# Patient Record
Sex: MALE | Race: WHITE | NOT HISPANIC OR LATINO | Employment: STUDENT | URBAN - METROPOLITAN AREA
[De-identification: names, ages, dates, MRNs, and addresses within clinical notes are randomized per-mention and may not be internally consistent; named-entity substitution may affect disease eponyms.]

---

## 2023-06-08 ENCOUNTER — TELEPHONE (OUTPATIENT)
Dept: PEDIATRICS | Facility: CLINIC | Age: 18
End: 2023-06-08
Payer: COMMERCIAL

## 2023-06-09 ENCOUNTER — OFFICE VISIT (OUTPATIENT)
Dept: PEDIATRICS | Facility: CLINIC | Age: 18
End: 2023-06-09
Payer: COMMERCIAL

## 2023-06-09 VITALS — WEIGHT: 174.6 LBS | TEMPERATURE: 98.8 F

## 2023-06-09 DIAGNOSIS — B34.9 VIRAL SYNDROME: Primary | ICD-10-CM

## 2023-06-09 DIAGNOSIS — J02.9 ACUTE PHARYNGITIS, UNSPECIFIED ETIOLOGY: ICD-10-CM

## 2023-06-09 PROBLEM — M43.10 SPONDYLOLISTHESIS: Status: ACTIVE | Noted: 2019-06-12

## 2023-06-09 PROBLEM — S06.0X0A CONCUSSION WITHOUT LOSS OF CONSCIOUSNESS: Status: ACTIVE | Noted: 2023-06-09

## 2023-06-09 LAB — POC RAPID STREP: NEGATIVE

## 2023-06-09 PROCEDURE — 87880 STREP A ASSAY W/OPTIC: CPT | Performed by: PEDIATRICS

## 2023-06-09 PROCEDURE — 87070 CULTURE OTHR SPECIMN AEROBIC: CPT

## 2023-06-09 PROCEDURE — 99213 OFFICE O/P EST LOW 20 MIN: CPT | Performed by: PEDIATRICS

## 2023-06-09 PROCEDURE — 87205 SMEAR GRAM STAIN: CPT

## 2023-06-09 NOTE — PROGRESS NOTES
Subjective   History was provided by the patient.  Michael Juan is a 17 y.o. male who presents for evaluation of Headache, Congestion, Sore Throat, and Swollen glands  Onset of symptoms was 8 day(s) ago as relatively mild ST, did feel worse a few days ago but is now improving again.  No fevers throughout.  Still eating and drinking.    He is drinking plenty of fluids.   Evaluation to date: none  Treatment to date: none  Ill Contact: Strep in some coworks.  Also had oral sex unprotected and has some concerns might be an STI since he has had GC pharyngitis before.      Objective   Visit Vitals  Temp 37.1 °C (98.8 °F)   Wt 79.2 kg (174 lb 9.6 oz)      Physical Exam  Vitals and nursing note reviewed. Exam conducted with a chaperone present.   Constitutional:       Appearance: Normal appearance.   HENT:      Head: Normocephalic and atraumatic.      Right Ear: Tympanic membrane, ear canal and external ear normal.      Left Ear: Tympanic membrane, ear canal and external ear normal.      Nose: Nose normal.      Mouth/Throat:      Mouth: Mucous membranes are moist.      Pharynx: No posterior oropharyngeal erythema (mild-mod).      Tonsils: 2+ on the right. 2+ on the left.   Eyes:      Conjunctiva/sclera: Conjunctivae normal.      Pupils: Pupils are equal, round, and reactive to light.   Neck:      Comments: Shotty ant cervical LN B  Cardiovascular:      Rate and Rhythm: Normal rate and regular rhythm.      Heart sounds: Normal heart sounds. No murmur heard.  Pulmonary:      Effort: Pulmonary effort is normal.      Breath sounds: Normal breath sounds.   Abdominal:      General: Abdomen is flat. Bowel sounds are normal.      Palpations: Abdomen is soft.   Musculoskeletal:      Cervical back: Normal range of motion and neck supple.   Lymphadenopathy:      Cervical: No cervical adenopathy.   Skin:     General: Skin is warm.   Neurological:      Mental Status: He is alert and oriented to person, place, and time. Mental status  is at baseline.         RAPID TESTING:  Rapid Strep  negative  SWABS SENT TODAY INCLUDE:  Bacterial culture swab      Diagnoses and all orders for this visit:  Viral syndrome  Acute pharyngitis, unspecified etiology  -     Respiratory Culture/Smear; Future  -     POCT rapid strep A manually resulted   Generally well appearing with reassuring exam.  Rapid strep neg, will await full bacterial culture of throat (including GC/CT/strep).  If all negative, then likely other viral syndrome.  Supportive care, push fluids and follow up as needed if sx persist or worsen.

## 2023-06-11 LAB
GRAM STN SPEC: NORMAL
RESPIRATORY CULTURE/SMEAR: NORMAL

## 2023-06-12 NOTE — RESULT ENCOUNTER NOTE
Please let Michael know that there wasn't anything specific grown on his throat culture.  Hopefully he is feeling better and please let us know if there are other concerns. Thanks!

## 2024-06-03 ENCOUNTER — OFFICE VISIT (OUTPATIENT)
Dept: PEDIATRICS | Facility: CLINIC | Age: 19
End: 2024-06-03
Payer: COMMERCIAL

## 2024-06-03 VITALS — BODY MASS INDEX: 23.09 KG/M2 | WEIGHT: 167.4 LBS | TEMPERATURE: 97.7 F

## 2024-06-03 DIAGNOSIS — J02.9 SORE THROAT: ICD-10-CM

## 2024-06-03 DIAGNOSIS — J01.90 ACUTE NON-RECURRENT SINUSITIS, UNSPECIFIED LOCATION: Primary | ICD-10-CM

## 2024-06-03 LAB — POC RAPID STREP: NEGATIVE

## 2024-06-03 PROCEDURE — 87880 STREP A ASSAY W/OPTIC: CPT | Performed by: PEDIATRICS

## 2024-06-03 PROCEDURE — 99213 OFFICE O/P EST LOW 20 MIN: CPT | Performed by: PEDIATRICS

## 2024-06-03 RX ORDER — AMOXICILLIN 875 MG/1
875 TABLET, FILM COATED ORAL 2 TIMES DAILY
Qty: 14 TABLET | Refills: 0 | Status: SHIPPED | OUTPATIENT
Start: 2024-06-03 | End: 2024-06-10

## 2024-06-03 NOTE — PROGRESS NOTES
"Subjective   Michael Juan is a 18 y.o. male who presents for Nasal Congestion (Congestion/sore throat/eye discharge/fatigue since May 17th/Here by himself).  Today he is accompanied by caregiver who is also providing history.  HPI:    2-3 wks of sx.  Started when in [place].  Possible fevers at onset.  Sick contacts:  was in a group of 16 and some viral sx present.  No vomiting.  Thick yellow sputum in morning.    Objective   Temp 36.5 °C (97.7 °F) (Tympanic)   Wt 75.9 kg (167 lb 6.4 oz)   BMI 23.09 kg/m²   Physical Exam  Constitutional:       Appearance: Normal appearance.   HENT:      Right Ear: Tympanic membrane and external ear normal.      Left Ear: Tympanic membrane and external ear normal.      Nose: Congestion and rhinorrhea present.      Mouth/Throat:      Mouth: Mucous membranes are moist.   Eyes:      General:         Right eye: No discharge.         Left eye: No discharge.      Extraocular Movements: Extraocular movements intact.      Conjunctiva/sclera: Conjunctivae normal.      Pupils: Pupils are equal, round, and reactive to light.   Cardiovascular:      Rate and Rhythm: Normal rate and regular rhythm.      Heart sounds: Normal heart sounds.   Pulmonary:      Effort: Pulmonary effort is normal.      Breath sounds: Normal breath sounds.   Abdominal:      General: Bowel sounds are normal.      Palpations: Abdomen is soft.   Musculoskeletal:      Cervical back: Neck supple.   Lymphadenopathy:      Cervical: No cervical adenopathy.   Skin:     General: Skin is warm.   Neurological:      General: No focal deficit present.      Mental Status: He is alert.       Assessment/Plan   Problem List Items Addressed This Visit    None  Visit Diagnoses       Acute non-recurrent sinusitis, unspecified location    -  Primary    Relevant Medications    amoxicillin (Amoxil) 875 mg tablet    Sore throat        Relevant Orders    POCT rapid strep A manually resulted (Completed)        ENCOURAGED \"HOLD\" OPTION  As pt " is well appearing, no fevers, and infection is mild, I advised to give a few more days to see how things progress. If symptoms persist, or worsen, start abx, which has been sent to pharmacy.  Discussed sx tx.  -F/U after 2-3 days of abx if not improving.  Defer strep PCR:  low suspicion at this time.

## 2024-08-06 ENCOUNTER — OFFICE VISIT (OUTPATIENT)
Dept: DERMATOLOGY | Facility: CLINIC | Age: 19
End: 2024-08-06
Payer: COMMERCIAL

## 2024-08-06 DIAGNOSIS — L72.0 EPIDERMOID CYST: Primary | ICD-10-CM

## 2024-08-06 DIAGNOSIS — L85.3 XEROSIS CUTIS: ICD-10-CM

## 2024-08-06 DIAGNOSIS — L73.9 FOLLICULITIS: ICD-10-CM

## 2024-08-06 PROCEDURE — 99204 OFFICE O/P NEW MOD 45 MIN: CPT | Performed by: DERMATOLOGY

## 2024-08-06 RX ORDER — KETOCONAZOLE 20 MG/G
CREAM TOPICAL
COMMUNITY
Start: 2023-08-09

## 2024-08-06 RX ORDER — CLINDAMYCIN PHOSPHATE 10 UG/ML
LOTION TOPICAL 2 TIMES DAILY
Qty: 60 ML | Refills: 11 | Status: SHIPPED | OUTPATIENT
Start: 2024-08-06 | End: 2025-08-06

## 2024-08-06 RX ORDER — IBUPROFEN 400 MG/1
400 TABLET ORAL EVERY 6 HOURS PRN
COMMUNITY

## 2024-08-06 ASSESSMENT — DERMATOLOGY QUALITY OF LIFE (QOL) ASSESSMENT: ARE THERE EXCLUSIONS OR EXCEPTIONS FOR THE QUALITY OF LIFE ASSESSMENT: NO

## 2024-08-06 ASSESSMENT — DERMATOLOGY PATIENT ASSESSMENT
DO YOU USE A TANNING BED: NO
DO YOU HAVE ANY NEW OR CHANGING LESIONS: YES
DO YOU USE SUNSCREEN: OCCASIONALLY
WHERE ARE THESE NEW OR CHANGING LESIONS LOCATED: LEFT BUTTOCK

## 2024-08-06 NOTE — PROGRESS NOTES
Subjective     Michael Juan is a 18 y.o. male who presents for the following: Cyst (In anal area).  The patient states he noticed a white bump or pimple in his anal area 2 months ago.  He states it intermittently gets red, swollen, and painful, but it has not increased in size.  He also notes intermittent pimple breakouts on his buttocks.      Review of Systems:  No other skin or systemic complaints other than what is documented elsewhere in the note.    The following portions of the chart were reviewed this encounter and updated as appropriate:       Skin Cancer History  No skin cancer on file.    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 and is studying accounting and [employer]    Allergies:  Latex    Current Medications / CAM's:    Current Outpatient Medications:     ketoconazole (NIZOral) 2 % cream, Apply to external ears twice daily for 4-6 weeks at a time, Disp: , Rfl:     clindamycin (Cleocin T) 1 % lotion, Apply topically 2 times a day., Disp: 60 mL, Rfl: 11    ibuprofen 400 mg tablet, Take 1 tablet (400 mg) by mouth every 6 hours if needed., Disp: , Rfl:      Objective   Well appearing patient in no apparent distress; mood and affect are within normal limits.    A skin examination was performed including: Face, neck, and bilateral buttocks. All findings within normal limits unless otherwise noted below.    Assessment/Plan   1. Epidermoid cyst  Gluteal Crease  On the patient's right perianal skin, there is a 3 mm round, whitish-colored, cystic-appearing papule    Epidermoid Cyst - right eyad-anal skin.  The benign nature of this cystic lesion was discussed with the patient today and reassurance provided.  No treatment is medically indicated for this lesion at this time.  He expressed  understanding and is in agreement with this plan.    2. Folliculitis  Right Buttock  Scattered on the patient's bilateral buttocks, there are several follicular-based erythematous, inflammatory papules and pustules    Folliculitis - bilateral buttocks.  The bacterial nature of this condition and treatment options were discussed with the patient today.  At this time, I recommend topical antibiotic therapy with Clindamycin 1% lotion, which the patient was instructed to apply twice daily to the affected areas or up to 3-4 times per day as needed for active lesions.  The risks, benefits, and side effects of this medication were discussed.  The patient expressed understanding and is in agreement with this plan.    clindamycin (Cleocin T) 1 % lotion - Right Buttock  Apply topically 2 times a day.    3. Xerosis cutis  Diffuse generalized dry, scaly skin.    Xerosis.  I emphasized the importance of dry, sensitive skin care, including the use of a mild soap, such as Dove, and frequent and aggressive moisturization, at least twice daily and immediately following showers or baths, with recommended over-the-counter moisturizing creams, such as Eucerin, Cetaphil, Cerave, or Aveeno, or Vaseline or Aquaphor ointments.

## 2024-10-10 ENCOUNTER — APPOINTMENT (OUTPATIENT)
Dept: PEDIATRICS | Facility: CLINIC | Age: 19
End: 2024-10-10
Payer: COMMERCIAL

## 2024-10-10 VITALS
HEART RATE: 69 BPM | DIASTOLIC BLOOD PRESSURE: 70 MMHG | WEIGHT: 177 LBS | BODY MASS INDEX: 24.41 KG/M2 | SYSTOLIC BLOOD PRESSURE: 109 MMHG

## 2024-10-10 DIAGNOSIS — M54.50 ACUTE BILATERAL LOW BACK PAIN WITHOUT SCIATICA: Primary | ICD-10-CM

## 2024-10-10 DIAGNOSIS — M53.3 SACROILIAC JOINT PAIN: ICD-10-CM

## 2024-10-10 PROCEDURE — 99213 OFFICE O/P EST LOW 20 MIN: CPT | Performed by: PEDIATRICS

## 2024-10-10 ASSESSMENT — ENCOUNTER SYMPTOMS
WEIGHT LOSS: 0
PARESTHESIAS: 0
WEAKNESS: 0
FEVER: 0
DYSURIA: 0
BACK PAIN: 1
ABDOMINAL PAIN: 0
NUMBNESS: 0
TINGLING: 0
BOWEL INCONTINENCE: 0
LEG PAIN: 0
HEADACHES: 0

## 2024-10-10 NOTE — PROGRESS NOTES
Subjective   Michael Juan is a 18 y.o. male who presents with Other (Here by himself /C/O Low back pain since may 2024).    Back Pain  This is a recurrent problem. Episode onset: started May 2024 (5 months ago) Episode frequency: has been intermittent - but gets more frequent or severe. The symptoms are aggravated by bending, sitting, twisting and standing. Stiffness is present: no pattern. Pertinent negatives include no abdominal pain, bladder incontinence, bowel incontinence, chest pain, dysuria, fever, headaches, leg pain, numbness, paresthesias, tingling, weakness or weight loss. He has tried home exercises (stretches) for the symptoms. The treatment provided mild relief.     No inciting trauma or event  Works out regularly (4-5 times per week) - almost exclusively lifting, no cardio    No weakness, numbness / tingling to LE  No shooting pain      Objective   /70 (BP Location: Left arm, Patient Position: Sitting)   Pulse 69   Wt 80.3 kg (177 lb)   BMI 24.41 kg/m²     Physical Exam  Constitutional:       Appearance: Normal appearance.   Musculoskeletal:      Cervical back: Normal. No swelling or bony tenderness.      Thoracic back: Normal. No swelling, tenderness or bony tenderness.      Lumbar back: Tenderness (lumbrosacral junction, at SI joint and above) present. No swelling or bony tenderness.      Comments: When supine:   No pain with passive leg raise or resisted leg raise  Mild pain to lower back with resisted right foot flexion  No pain with bilat internal/external hip rotation    PAIN with passive toe touch to SI joints   Neurological:      Mental Status: He is alert.         Assessment/Plan   19 yo male with lumbrosacral muscular lower back pain vs SI joint pain   - discussed likely due to overuse and strenous weight lifting regimen   - one week rest (no lifting)   - NSAIDs (ibuprofen 600mg PO BID) x 7 days   - call in one week with update   - if not improving, will get XR and refer to  PT        Jf Gallo MD

## 2024-11-12 DIAGNOSIS — M54.50 ACUTE BILATERAL LOW BACK PAIN WITHOUT SCIATICA: Primary | ICD-10-CM

## 2024-11-18 ENCOUNTER — HOSPITAL ENCOUNTER (OUTPATIENT)
Dept: RADIOLOGY | Facility: CLINIC | Age: 19
Discharge: HOME | End: 2024-11-18
Payer: COMMERCIAL

## 2024-11-18 DIAGNOSIS — M54.50 ACUTE BILATERAL LOW BACK PAIN WITHOUT SCIATICA: ICD-10-CM

## 2024-11-18 PROCEDURE — 72110 X-RAY EXAM L-2 SPINE 4/>VWS: CPT | Performed by: RADIOLOGY

## 2024-11-18 PROCEDURE — 72110 X-RAY EXAM L-2 SPINE 4/>VWS: CPT

## 2024-11-20 DIAGNOSIS — M43.17 SPONDYLOLISTHESIS OF LUMBOSACRAL REGION: Primary | ICD-10-CM

## 2024-11-20 PROBLEM — S06.0X0A CONCUSSION WITHOUT LOSS OF CONSCIOUSNESS: Status: RESOLVED | Noted: 2023-06-09 | Resolved: 2024-11-20

## 2024-11-27 ENCOUNTER — APPOINTMENT (OUTPATIENT)
Dept: SPORTS MEDICINE | Facility: HOSPITAL | Age: 19
End: 2024-11-27
Payer: COMMERCIAL

## 2024-12-17 ENCOUNTER — HOSPITAL ENCOUNTER (OUTPATIENT)
Dept: RADIOLOGY | Facility: CLINIC | Age: 19
Discharge: HOME | End: 2024-12-17
Payer: COMMERCIAL

## 2024-12-17 DIAGNOSIS — M43.17 SPONDYLOLISTHESIS OF LUMBOSACRAL REGION: ICD-10-CM

## 2024-12-17 PROCEDURE — 72148 MRI LUMBAR SPINE W/O DYE: CPT

## 2024-12-17 PROCEDURE — 72148 MRI LUMBAR SPINE W/O DYE: CPT | Performed by: RADIOLOGY

## 2024-12-19 ENCOUNTER — LAB (OUTPATIENT)
Dept: LAB | Facility: LAB | Age: 19
End: 2024-12-19
Payer: COMMERCIAL

## 2024-12-19 ENCOUNTER — APPOINTMENT (OUTPATIENT)
Dept: ORTHOPEDIC SURGERY | Facility: CLINIC | Age: 19
End: 2024-12-19
Payer: COMMERCIAL

## 2024-12-19 ENCOUNTER — OFFICE VISIT (OUTPATIENT)
Dept: PEDIATRICS | Facility: CLINIC | Age: 19
End: 2024-12-19
Payer: COMMERCIAL

## 2024-12-19 VITALS — BODY MASS INDEX: 22.62 KG/M2 | WEIGHT: 164 LBS | TEMPERATURE: 97.9 F

## 2024-12-19 DIAGNOSIS — M43.17 SPONDYLOLISTHESIS OF LUMBOSACRAL REGION: ICD-10-CM

## 2024-12-19 DIAGNOSIS — Z91.89 HISTORY OF SEXUAL BEHAVIOR WITH HIGH RISK OF EXPOSURE TO COMMUNICABLE DISEASE: Primary | ICD-10-CM

## 2024-12-19 DIAGNOSIS — J02.9 ACUTE PHARYNGITIS, UNSPECIFIED ETIOLOGY: ICD-10-CM

## 2024-12-19 DIAGNOSIS — Z72.51 HIGH RISK HETEROSEXUAL BEHAVIOR: ICD-10-CM

## 2024-12-19 LAB
HBV CORE AB SER QL: NONREACTIVE
HBV SURFACE AB SER-ACNC: <3.1 MIU/ML
HBV SURFACE AG SERPL QL IA: NONREACTIVE
HIV 1+2 AB+HIV1 P24 AG SERPL QL IA: NONREACTIVE

## 2024-12-19 PROCEDURE — 1036F TOBACCO NON-USER: CPT | Performed by: PEDIATRICS

## 2024-12-19 PROCEDURE — 36415 COLL VENOUS BLD VENIPUNCTURE: CPT

## 2024-12-19 PROCEDURE — 99203 OFFICE O/P NEW LOW 30 MIN: CPT | Performed by: ORTHOPAEDIC SURGERY

## 2024-12-19 PROCEDURE — 87389 HIV-1 AG W/HIV-1&-2 AB AG IA: CPT

## 2024-12-19 PROCEDURE — 87340 HEPATITIS B SURFACE AG IA: CPT

## 2024-12-19 PROCEDURE — 86706 HEP B SURFACE ANTIBODY: CPT

## 2024-12-19 PROCEDURE — 86704 HEP B CORE ANTIBODY TOTAL: CPT

## 2024-12-19 PROCEDURE — 99214 OFFICE O/P EST MOD 30 MIN: CPT | Performed by: PEDIATRICS

## 2024-12-19 ASSESSMENT — PAIN - FUNCTIONAL ASSESSMENT: PAIN_FUNCTIONAL_ASSESSMENT: NO/DENIES PAIN

## 2024-12-19 NOTE — PROGRESS NOTES
Subjective   Michael Juan is a 19 y.o. male who presents with Other (Here by himself /C/O Stomach Pain and Bumps Down Throat /Urine CX : Collected - Per PT ).    Just got back from [facility] (x 1 week)  4-5 days ago had encounter which he has concerns re STI exposure  Encounter was limited to kissing and brief oral-genital contact (bidirectional)  No intercourse (genital / anal)  States neither he or his partner ejaculated  Partner did not have any sores, open wounds, scabbing, or other lesions visible on the penis    Prior to this event, last sexual encounter was in Dec 2023, last STI eval was Feb 2024.      Denies any genital rash, lesions, sores  Denies any dysuria, urgency or discharge  Denies any  pain or other complaints    Did feel mild ST and thought he saw some white patches in throat  few days ago, now resolved   - this timing correlated with right after getting home from [facility] (30 hours of traveling)    No current fever, ST, cough or other symptoms  ROS otherwise negative      Objective   Temp 36.6 °C (97.9 °F) (Tympanic)   Wt 74.4 kg (164 lb)   BMI 22.62 kg/m²     Physical Exam  Vitals reviewed.   Constitutional:       General: He is not in acute distress.     Appearance: Normal appearance.   HENT:      Head: Normocephalic and atraumatic.      Right Ear: Tympanic membrane, ear canal and external ear normal.      Left Ear: Tympanic membrane, ear canal and external ear normal.      Nose: Nose normal.      Mouth/Throat:      Mouth: Mucous membranes are moist.      Pharynx: No oropharyngeal exudate or posterior oropharyngeal erythema.   Eyes:      Conjunctiva/sclera: Conjunctivae normal.   Cardiovascular:      Rate and Rhythm: Normal rate and regular rhythm.      Heart sounds: No murmur heard.  Pulmonary:      Effort: Pulmonary effort is normal. No respiratory distress.      Breath sounds: Normal breath sounds. No stridor. No wheezing, rhonchi or rales.   Abdominal:      General: Abdomen is flat.       Palpations: Abdomen is soft. There is no hepatomegaly, splenomegaly or mass.      Tenderness: There is no abdominal tenderness. There is no guarding.   Genitourinary:     Pubic Area: No rash.       Penis: Normal. No erythema, discharge or lesions.       Testes: Normal.         Right: Tenderness not present.         Left: Tenderness not present.   Musculoskeletal:         General: Normal range of motion.      Cervical back: Normal range of motion and neck supple.   Lymphadenopathy:      Cervical: Cervical adenopathy (shotty) present.   Skin:     General: Skin is warm and dry.   Neurological:      Mental Status: He is alert.   Psychiatric:         Mood and Affect: Mood normal.         Assessment/Plan   20 yo male with possible STI exposure   - reviewed sexual history and identified potential high risk exposures   - provided education re risk, safety, use of condoms   - HIV test (last test was 2 months post high risk exposure)   - HepB - last test indicated not immune - will confirm   - Throat GC screen    - report any changes or new symptoms/concerns      ADDENDUM (12/20)   - HIV negative, Hep B - non immune   - discussed with patient, recommended Hep B re-vaccination   - report any new concerns    Jf Gallo MD

## 2024-12-19 NOTE — PROGRESS NOTES
19-year-old male referred for evaluation of lower back pain.  He has been having lower back pain since May of this year.  Pain does not radiate.  He has not had any formal treatment.  No bowel or bladder incontinence.    He recently had an MRI that demonstrated isthmic spondylolisthesis.    He is otherwise in good health.  He does not smoke.    On exam he is well-appearing in no distress.  Normal gait.  No focal neurologic deficits.  Negative straight leg raise test.    I personally reviewed x-rays and MRI of his lumbar spine.  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  There is minimal anterolisthesis of L5 on S1.  No significant central or foraminal stenosis.    19-year-old male with axial lower back pain, bilateral pars fractures and early spondylolisthesis.  I reassured him that patients with this condition who have axial lower back pain mirrors that of patients who do not have pars fractures or spondylolisthesis.  He does not have any neurologic symptoms, I reassured him there is excellent prognosis for spontaneous recovery.    I recommended a course of physical therapy and he was given a prescription.  He can use over-the-counter NSAIDs as needed.  We discussed the importance of ongoing aerobic exercise.  He should avoid extension based weightlifting exercises such as deadlifts, otherwise he can return to activities as tolerated.    I would be happy to see him back on an as-needed basis if he begins to develop neurologic symptoms such as claudication or pain and numbness radiating down the legs.    *This note was dictated using speech recognition software and was not corrected for spelling or grammatical errors*      Past Medical History:   Diagnosis Date    Advanced bone age 11/04/2015    Allergic dermatitis 06/07/2012         Current Outpatient Medications:     clindamycin (Cleocin T) 1 % lotion, Apply topically 2 times a day., Disp: 60 mL, Rfl: 11    ibuprofen 400 mg tablet, Take 1 tablet (400 mg) by  mouth every 6 hours if needed., Disp: , Rfl:     ketoconazole (NIZOral) 2 % cream, Apply to external ears twice daily for 4-6 weeks at a time, Disp: , Rfl:      Social History     Socioeconomic History    Marital status: Single     Spouse name: Not on file    Number of children: Not on file    Years of education: Not on file    Highest education level: Not on file   Occupational History    Not on file   Tobacco Use    Smoking status: Not on file    Smokeless tobacco: Not on file   Substance and Sexual Activity    Alcohol use: Not on file    Drug use: Not on file    Sexual activity: Not on file   Other Topics Concern    Not on file   Social History Narrative    Not on file     Social Drivers of Health     Financial Resource Strain: Not on file   Food Insecurity: Not on file   Transportation Needs: Not on file   Physical Activity: Not on file   Stress: Not on file   Social Connections: Not on file   Intimate Partner Violence: Not on file   Housing Stability: Not on file       Aaron Cortez MD  Director, [facility]   Department of Orthopaedic Surgery  [address]  [place] [id number]  [phone]

## 2025-05-07 DIAGNOSIS — M54.50 ACUTE BILATERAL LOW BACK PAIN WITHOUT SCIATICA: Primary | ICD-10-CM

## 2025-05-07 DIAGNOSIS — M43.17 SPONDYLOLISTHESIS OF LUMBOSACRAL REGION: ICD-10-CM

## 2025-05-07 NOTE — PROGRESS NOTES
"Subjective   Michael Juan is a 19 y.o. male who presents with {patient/caregiver:88147::\"patient\"} for evaluation.  Current symptoms include {Symptoms; uri:99264}    History of Present Illness        All other ROS negative     Objective   There were no vitals taken for this visit.     Physical Exam      Physical Exam    Assessment & Plan        Assessment/Plan   {PLAN; PED SICK VISIT GENERAL:98835}.    {Plan; viral:98690::\"Normal progression of disease discussed.\",\"All questions answered.\",\"Explained the rationale for symptomatic treatment rather than use of an antibiotic.\",\"Instruction provided in the use of fluids, vaporizer, acetaminophen, and other OTC medication for symptom control.\",\"Extra fluids\",\"Follow up as needed should symptoms fail to improve.\"}    This medical note was created with the assistance of artificial intelligence (AI) for documentation purposes. The content has been reviewed and confirmed by the healthcare provider for accuracy and completeness. Patient consented to the use of audio recording and use of AI during their visit.   Jf Gallo MD   "

## 2025-05-14 ENCOUNTER — OFFICE VISIT (OUTPATIENT)
Dept: ORTHOPEDIC SURGERY | Facility: CLINIC | Age: 20
End: 2025-05-14
Payer: COMMERCIAL

## 2025-05-14 ENCOUNTER — APPOINTMENT (OUTPATIENT)
Dept: ORTHOPEDIC SURGERY | Facility: CLINIC | Age: 20
End: 2025-05-14
Payer: COMMERCIAL

## 2025-05-14 DIAGNOSIS — M54.50 ACUTE BILATERAL LOW BACK PAIN WITHOUT SCIATICA: ICD-10-CM

## 2025-05-21 PROBLEM — M54.9 BACK PAIN: Status: ACTIVE | Noted: 2025-05-21

## 2025-05-21 NOTE — PROGRESS NOTES
Physical Therapy  Physical Therapy Orthopedic Evaluation    Patient Name: Michael Juan  MRN: [id number]  Today's Date: 5/22/2025         Insurance:  Insurance Type: MMO  Visit number: 1  Visit Limit: 20  Authorization info: NA    General:  Reason for visit: low back pain  Referred by: Diego      Precautions: avoid ext     STEADI Fall Risk Score (The score of 4 or more indicates an increased risk of falling): 0    Current Problem  1. Back pain  Follow Up In Physical Therapy      2. Spondylolisthesis of lumbosacral region  Referral to Physical Therapy            Subjective:   Subjective   Chief Complaint: low back pain  Onset: 5/1/24  KAYLIN: acute on chronic  Pt reports to session with acute on chronic low back pain that has been ongoing overall since last May but has intermittently been aggravated. Pt reports lifting last May when initial injury onset, then was exacerbated a few weeks ago while lifting his cousin. Denies any popping sensation with either incident and denies any radiation of numbness, tingling. He notes symptoms were very severe earlier this yared, prompting him to see MD and be referred to therapy. He states at the time he was barely able to walk and could not flex, extend spine without severe pain. Pt has normalized since but still limits daily ability to perform recreational, functional activities. 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 Pt is hoping to return to PLOF.    Current Condition:  same    PAIN  Intensity (0-10): 2/10 current; 9/10 worst  Location: low back  Description: sharp, cramping     Aggravating Factors:  sports, running, jogging   Relieving Factors:  rest, heat, otc meds    Relevant Information (PMH & Previous Tests/Imaging): xray, MRI  Previous Interventions/Treatments: n/a    Prior Level of Function (PLOF)  Exercise/Physical Activity: independent  Work/School: independent    Treatment Goals: reduce pain, return to PLOF    Red Flags: Do you have any of  the following? No  Fever/chills, unexplained weight changes, dizziness/fainting, unexplained change in bowel or bladder functions, unexplained malaise or muscle weakness, night pain/sweats, numbness or tingling    Objective:  Objective   Observation: normal posture    Gait: normal gait pattern    4 Stage Balance Test: 30 sec grossly     Lumbar AROM  Flexion: 80%  Extension: 80%  Rotation  R: 90%  L: 90%  Lateral Flexion  R: 90%  L: 90%    LE Myotomes: grossly intact  LE Dermatomes: grossly intact    Flexibility  Hamstrings: mod tight     Special Tests  SLR: (+) R  Crossed SLR: (-)   J LUIS: (-)     Functional Screening  Squat: normal, mild pain  Lunge: normal, mild pain  SL Balance: 30 sec   Lateral Step Down: nt  SL Quarter Squat: nt  Transfers: independent    Outcome Measures:  Other Measures  Oswestry Disablity Index (PRIYANKA): 6      EDUCATION: home exercise program, plan of care, activity modifications, pain management, and injury pathology       Goals:  Active       PT Problem       PT Goal 1       Start:  05/22/25    Expected End:  06/19/25       In 4 weeks, pt will rate pain 0-2 on the numeric pain scale to allow for restful nights of sleep.  In 4 weeks, pt will be able to perform 20 SL squats without pain.  In 4 weeks, pt will be able to stand to jog without pain.           PT Goal 2       Start:  05/22/25    Expected End:  07/17/25       In 8 weeks, pt will be able to return to gym, sports without pain.  In 8 weeks, pt's PRIYANKA will be +8 .  In 8 weeks, pt will be independent with HEP.               Treatments:   See below    Equipment Provided:   HEP Provided:    Access Code: [email or web address]  URL: [email or web address]  Date: 05/22/2025  Prepared by: Miguel Angel Wick    Exercises  - Supine Dead Bug with Leg Extension  - 1 x daily - 7 x weekly - 2 sets - 30-60 seconds hold  - Side Plank with Clam  - 1 x daily - 7 x weekly - 2 sets - 30-60 seconds hold  - Cat Cow to Child's Pose  - 1 x daily - 7 x  weekly - 2 sets - 30-60 seconds hold  - Bird Dog  - 1 x daily - 7 x weekly - 2 sets - 30-60 seconds hold  - Standing Anti-Rotation Press with Anchored Resistance  - 1 x daily - 7 x weekly - 2 sets - 30-60 seconds hold  - Anti-Rotation Squatting Sidestepping with Resistance  - 1 x daily - 7 x weekly - 2 sets - 30-60 seconds hold    Charges: low complexity eval x 1, Therapeutic Exercise x 1      Assessment: Pt is a 20 y/o M with signs and symptoms consistent with the referring diagnosis of B pars defect, anterolisthesis at L5-S1. Pt displayed lack of lumbar range of motion, gross lower extremity strength as well as flexibility, pain and functional mobility deficits. Skilled physical therapy is necessary in order to improve aforementioned impairments to allow for increased participation in functional and recreational activities without limitations. Plan of care will consist of core, lower extremity stretching and strengthening in order to return to PLOF.    Eval Complexity: Low  Clinical Presentation: Stable  Prognosis: Good      Plan: Continue to improve functional mobility, functional strength in order to increase participation in ADLs.    Patient and/or family understands and agrees with goals and plan documented.       Planned Interventions include: therapeutic exercise, self-care home management, manual therapy, therapeutic activities, neuromuscular coordination, vasopneumatic device with cold, blood flow restriction (BFR), dry needling, gait training  Frequency: 1x/week  Duration: 8 weeks    Insurance Auth Information    Date of Evaluation: 25    Onset Date: 2024    Referring Physician: Aaron Cortez     Surgery in the Last 3 months:  no    CPT Codes: Therapeutic Exercise: 02594 Therapeutic Activity: 75691 Neuromuscular Re-Education: 83868 Manual Therapy: 95360 Gait Trainin Self-Care/Home Management Trainin Mechanical Traction: 32959 Electric Stimulation (Attended): 28469 Electric  Stimulation (Unattended): 61939 Vasopneumatic Device: 20092 PT Re-Evaluation: 77841     Diagnosis:   Problem List Items Addressed This Visit           ICD-10-CM    Spondylolisthesis M43.10    Back pain - Primary M54.9    Relevant Orders    Follow Up In Physical Therapy          OT / PT Evaluation complexity:  low    Which of the following best describes the primary reason of therapy: Improving, restoring, or adapting functional mobility or skills    Visits Requested: 10    Date Range: 90 days    Select all conditions that apply: None of these apply       Miguel Angel Wick, PT

## 2025-05-22 ENCOUNTER — EVALUATION (OUTPATIENT)
Dept: PHYSICAL THERAPY | Facility: CLINIC | Age: 20
End: 2025-05-22
Payer: COMMERCIAL

## 2025-05-22 DIAGNOSIS — M43.17 SPONDYLOLISTHESIS OF LUMBOSACRAL REGION: ICD-10-CM

## 2025-05-22 DIAGNOSIS — M54.9 BACK PAIN: Primary | ICD-10-CM

## 2025-05-22 PROCEDURE — 97161 PT EVAL LOW COMPLEX 20 MIN: CPT | Mod: GP

## 2025-05-22 PROCEDURE — 97110 THERAPEUTIC EXERCISES: CPT | Mod: GP

## 2025-05-22 ASSESSMENT — ENCOUNTER SYMPTOMS
LOSS OF SENSATION IN FEET: 0
OCCASIONAL FEELINGS OF UNSTEADINESS: 0
DEPRESSION: 0

## 2025-05-27 ENCOUNTER — TREATMENT (OUTPATIENT)
Dept: PHYSICAL THERAPY | Facility: CLINIC | Age: 20
End: 2025-05-27
Payer: COMMERCIAL

## 2025-05-27 DIAGNOSIS — M54.9 BACK PAIN: Primary | ICD-10-CM

## 2025-05-27 PROCEDURE — 97110 THERAPEUTIC EXERCISES: CPT | Mod: GP

## 2025-05-27 PROCEDURE — 97112 NEUROMUSCULAR REEDUCATION: CPT | Mod: GP

## 2025-05-27 NOTE — PROGRESS NOTES
Physical Therapy  Physical Therapy Treatment    Patient Name: Michael Juan  MRN: [id number]  Today's Date: 5/27/2025    Time Calculation  Start Time: 1430  Stop Time: 1512  Time Calculation (min): 42 min    Insurance:  Insurance Type: MMO  Visit number: 2  Visit Limit: 20  Authorization info: NA    General:  Reason for visit: low back pain  Referred by: Diego    Precautions: avoid ext    Current Problem  1. Back pain            Pain: 1/10    Subjective:   Subjective   Patient reports he has been doing well. Has been doing HEP.    HEP Compliance: Good    Objective:   Objective     Normal gait pattern      Treatments:     Therapeutic Exercise:                                 Elliptical x 5'   SL RDL to overhead press w/ 13# 2 x 15   Physioball planks 2 x failure  Reverse planks 2 x failure  X-walk w/ OTB 2 x 50 feet   6 inch frontal tap downs w/ 5# med ball reaches 2 x 15  Alligator walks 2 x failure  Reviewed HEP  HEP printout      Neuromuscular Re-Education:                   Paloff cw/ccw circles w/ 20# 2 x 10 each  SL paloff press 5# 2 x 15  Offset farmers carries w/ 20, 45# kb 2 x 50 feet      Equipment Provided:   HEP Provided:  Access Code: [email or web address]  URL: [email or web address]  Date: 05/27/2025  Prepared by: Miguel Angel Wick    Exercises  - Supine Dead Bug with Leg Extension  - 1 x daily - 7 x weekly - 2 sets - 30-60 seconds hold  - Side Plank with Clam  - 1 x daily - 7 x weekly - 2 sets - 30-60 seconds hold  - Cat Cow to Child's Pose  - 1 x daily - 7 x weekly - 2 sets - 30-60 seconds hold  - Bird Dog  - 1 x daily - 7 x weekly - 2 sets - 30-60 seconds hold  - Standing Anti-Rotation Press with Anchored Resistance  - 1 x daily - 7 x weekly - 2 sets - 30-60 seconds hold  - Anti-Rotation Squatting Sidestepping with Resistance  - 1 x daily - 7 x weekly - 2 sets - 30-60 seconds hold  - Oblique Crunch  - 1 x daily - 7 x weekly - 2 sets - failure hold  - Hooklying Oblique Crunches (Alternating  Heel Touches)  - 1 x daily - 7 x weekly - 2 sets - failure hold  - Reverse Plank on Elbows  - 1 x daily - 7 x weekly - 2 sets - failure hold      Charges: Therapeutic Exercise x 2, Neuromuscular Re-Education x 1    Assessment: The focus of the session was Strengthening, Stretching, Balance, and Dynamic Stability Training. The pt demonstrated Good tolerance to the noted exercises today. The pt is demonstrated Good progress in skilled rehab at this time. The pt is still limited in overall Strength, ROM, Flexibility, Motor control, Balance, and Pain at this time. The pt continues to be a good candidate for skilled PT, in order to further improve Strength, ROM, Flexibility, Motor control, Balance, and Pain.          Plan: Continue to improve functional mobility, functional strength in order to increase participation in ADLs.       Next visit:       Miguel Angel Wick, PT

## 2025-06-15 NOTE — PROGRESS NOTES
Physical Therapy  Physical Therapy Treatment    Patient Name: Michael Juan  MRN: [id number]  Today's Date: 6/15/2025         Insurance:  Insurance Type: MMO  Visit number: 3  Visit Limit: 20  Authorization info: NA    General:  Reason for visit: low back pain  Referred by: Diego    Precautions: avoid ext    Current Problem  1. Back pain            Pain: {JAMEVALGOALS1:81019}/10    Subjective:   Subjective   Patient reports ***    HEP Compliance: {JAMHEPCOMP:92907}    Objective:   Objective           Treatments:     Therapeutic Exercise:                                 Elliptical x 5'   SL RDL to overhead press w/ 13# 2 x 15   Physioball planks 2 x failure  Reverse planks 2 x failure  X-walk w/ OTB 2 x 50 feet   6 inch frontal tap downs w/ 5# med ball reaches 2 x 15  Alligator walks 2 x failure  Reviewed HEP  HEP printout      Neuromuscular Re-Education:                   Paloff cw/ccw circles w/ 20# 2 x 10 each  SL paloff press 5# 2 x 15  Offset farmers carries w/ 20, 45# kb 2 x 50 feet      Equipment Provided:   HEP Provided:  Access Code: [email or web address]      Equipment Provided:   HEP Provided:      Charges: {jamcharges:29075} x {JAMEVALGOALS1:67709}, {jamcharges:06857} x {JAMEVALGOALS1:76464}    Assessment: ***       Plan: ***     Next visit:       Miguel Angel Wick PT

## 2025-06-16 ENCOUNTER — APPOINTMENT (OUTPATIENT)
Dept: PHYSICAL THERAPY | Facility: CLINIC | Age: 20
End: 2025-06-16
Payer: COMMERCIAL

## 2025-11-11 ENCOUNTER — APPOINTMENT (OUTPATIENT)
Dept: DERMATOLOGY | Facility: CLINIC | Age: 20
End: 2025-11-11
Payer: COMMERCIAL